# Patient Record
Sex: MALE | URBAN - METROPOLITAN AREA
[De-identification: names, ages, dates, MRNs, and addresses within clinical notes are randomized per-mention and may not be internally consistent; named-entity substitution may affect disease eponyms.]

---
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
## 2022-04-18 NOTE — HPI: MEDICATION (ISOTRETINOIN)
How Severe Is It?: moderate
Spoke to patient.  Patient states faxed lab results last Thursday 4-14-22 to fax [phone].  Advised patient I will look for it.  
Is This A New Presentation, Or A Follow-Up?: Follow Up Isotretinoin